# Patient Record
Sex: MALE | Race: BLACK OR AFRICAN AMERICAN | NOT HISPANIC OR LATINO | URBAN - METROPOLITAN AREA
[De-identification: names, ages, dates, MRNs, and addresses within clinical notes are randomized per-mention and may not be internally consistent; named-entity substitution may affect disease eponyms.]

---

## 2017-05-24 ENCOUNTER — OUTPATIENT (OUTPATIENT)
Dept: OUTPATIENT SERVICES | Facility: HOSPITAL | Age: 22
LOS: 1 days | Discharge: HOME | End: 2017-05-24

## 2017-06-28 DIAGNOSIS — Z01.21 ENCOUNTER FOR DENTAL EXAMINATION AND CLEANING WITH ABNORMAL FINDINGS: ICD-10-CM

## 2018-07-06 ENCOUNTER — OUTPATIENT (OUTPATIENT)
Dept: OUTPATIENT SERVICES | Facility: HOSPITAL | Age: 23
LOS: 1 days | Discharge: HOME | End: 2018-07-06

## 2022-05-11 ENCOUNTER — OUTPATIENT (OUTPATIENT)
Dept: OUTPATIENT SERVICES | Facility: HOSPITAL | Age: 27
LOS: 1 days | Discharge: HOME | End: 2022-05-11

## 2024-03-23 ENCOUNTER — EMERGENCY (EMERGENCY)
Facility: HOSPITAL | Age: 29
LOS: 0 days | Discharge: HOME | End: 2024-03-23
Attending: STUDENT IN AN ORGANIZED HEALTH CARE EDUCATION/TRAINING PROGRAM
Payer: COMMERCIAL

## 2024-03-23 VITALS
HEART RATE: 83 BPM | SYSTOLIC BLOOD PRESSURE: 118 MMHG | DIASTOLIC BLOOD PRESSURE: 61 MMHG | TEMPERATURE: 97 F | WEIGHT: 188.94 LBS | RESPIRATION RATE: 17 BRPM | OXYGEN SATURATION: 99 %
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
## 2024-03-23 DIAGNOSIS — S00.83XA CONTUSION OF OTHER PART OF HEAD, INITIAL ENCOUNTER: ICD-10-CM

## 2024-03-23 DIAGNOSIS — R56.9 UNSPECIFIED CONVULSIONS: ICD-10-CM
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
## 2024-03-23 LAB
ALBUMIN SERPL ELPH-MCNC: 4.9 G/DL — SIGNIFICANT CHANGE UP (ref 3.5–5.2)
ALP SERPL-CCNC: 44 U/L — SIGNIFICANT CHANGE UP (ref 30–115)
ALT FLD-CCNC: 12 U/L — SIGNIFICANT CHANGE UP (ref 0–41)
ANION GAP SERPL CALC-SCNC: 12 MMOL/L — SIGNIFICANT CHANGE UP (ref 7–14)
APAP SERPL-MCNC: <5 UG/ML — LOW (ref 10–30)
AST SERPL-CCNC: 22 U/L — SIGNIFICANT CHANGE UP (ref 0–41)
BASOPHILS # BLD AUTO: 0.02 K/UL — SIGNIFICANT CHANGE UP (ref 0–0.2)
BASOPHILS NFR BLD AUTO: 0.4 % — SIGNIFICANT CHANGE UP (ref 0–1)
BILIRUB SERPL-MCNC: 1.4 MG/DL — HIGH (ref 0.2–1.2)
BUN SERPL-MCNC: 14 MG/DL — SIGNIFICANT CHANGE UP (ref 10–20)
CALCIUM SERPL-MCNC: 9.3 MG/DL — SIGNIFICANT CHANGE UP (ref 8.4–10.5)
CHLORIDE SERPL-SCNC: 101 MMOL/L — SIGNIFICANT CHANGE UP (ref 98–110)
CO2 SERPL-SCNC: 23 MMOL/L — SIGNIFICANT CHANGE UP (ref 17–32)
CREAT SERPL-MCNC: 1.1 MG/DL — SIGNIFICANT CHANGE UP (ref 0.7–1.5)
EGFR: 95 ML/MIN/1.73M2 — SIGNIFICANT CHANGE UP
EOSINOPHIL # BLD AUTO: 0.14 K/UL — SIGNIFICANT CHANGE UP (ref 0–0.7)
EOSINOPHIL NFR BLD AUTO: 3.1 % — SIGNIFICANT CHANGE UP (ref 0–8)
ETHANOL SERPL-MCNC: <10 MG/DL — SIGNIFICANT CHANGE UP
GLUCOSE SERPL-MCNC: 120 MG/DL — HIGH (ref 70–99)
HCT VFR BLD CALC: 40.6 % — LOW (ref 42–52)
HGB BLD-MCNC: 13.6 G/DL — LOW (ref 14–18)
IMM GRANULOCYTES NFR BLD AUTO: 0.2 % — SIGNIFICANT CHANGE UP (ref 0.1–0.3)
LYMPHOCYTES # BLD AUTO: 1.9 K/UL — SIGNIFICANT CHANGE UP (ref 1.2–3.4)
LYMPHOCYTES # BLD AUTO: 41.8 % — SIGNIFICANT CHANGE UP (ref 20.5–51.1)
MAGNESIUM SERPL-MCNC: 2.4 MG/DL — SIGNIFICANT CHANGE UP (ref 1.8–2.4)
MCHC RBC-ENTMCNC: 27.9 PG — SIGNIFICANT CHANGE UP (ref 27–31)
MCHC RBC-ENTMCNC: 33.5 G/DL — SIGNIFICANT CHANGE UP (ref 32–37)
MCV RBC AUTO: 83.4 FL — SIGNIFICANT CHANGE UP (ref 80–94)
MONOCYTES # BLD AUTO: 0.44 K/UL — SIGNIFICANT CHANGE UP (ref 0.1–0.6)
MONOCYTES NFR BLD AUTO: 9.7 % — HIGH (ref 1.7–9.3)
NEUTROPHILS # BLD AUTO: 2.04 K/UL — SIGNIFICANT CHANGE UP (ref 1.4–6.5)
NEUTROPHILS NFR BLD AUTO: 44.8 % — SIGNIFICANT CHANGE UP (ref 42.2–75.2)
NRBC # BLD: 0 /100 WBCS — SIGNIFICANT CHANGE UP (ref 0–0)
PLATELET # BLD AUTO: 196 K/UL — SIGNIFICANT CHANGE UP (ref 130–400)
PMV BLD: 9.7 FL — SIGNIFICANT CHANGE UP (ref 7.4–10.4)
POTASSIUM SERPL-MCNC: 4.1 MMOL/L — SIGNIFICANT CHANGE UP (ref 3.5–5)
POTASSIUM SERPL-SCNC: 4.1 MMOL/L — SIGNIFICANT CHANGE UP (ref 3.5–5)
PROT SERPL-MCNC: 7.3 G/DL — SIGNIFICANT CHANGE UP (ref 6–8)
RBC # BLD: 4.87 M/UL — SIGNIFICANT CHANGE UP (ref 4.7–6.1)
RBC # FLD: 13.2 % — SIGNIFICANT CHANGE UP (ref 11.5–14.5)
SALICYLATES SERPL-MCNC: <0.3 MG/DL — LOW (ref 4–30)
SODIUM SERPL-SCNC: 136 MMOL/L — SIGNIFICANT CHANGE UP (ref 135–146)
WBC # BLD: 4.55 K/UL — LOW (ref 4.8–10.8)
WBC # FLD AUTO: 4.55 K/UL — LOW (ref 4.8–10.8)

## 2024-03-23 PROCEDURE — 99285 EMERGENCY DEPT VISIT HI MDM: CPT

## 2024-03-23 PROCEDURE — 70450 CT HEAD/BRAIN W/O DYE: CPT | Mod: 26,MC

## 2024-03-23 PROCEDURE — 82962 GLUCOSE BLOOD TEST: CPT

## 2024-03-23 PROCEDURE — 93010 ELECTROCARDIOGRAM REPORT: CPT

## 2024-03-23 PROCEDURE — 99234 HOSP IP/OBS SM DT SF/LOW 45: CPT

## 2024-03-23 PROCEDURE — 36415 COLL VENOUS BLD VENIPUNCTURE: CPT

## 2024-03-23 PROCEDURE — 99285 EMERGENCY DEPT VISIT HI MDM: CPT | Mod: 25

## 2024-03-23 PROCEDURE — 85025 COMPLETE CBC W/AUTO DIFF WBC: CPT

## 2024-03-23 PROCEDURE — 70450 CT HEAD/BRAIN W/O DYE: CPT | Mod: MC

## 2024-03-23 PROCEDURE — 83735 ASSAY OF MAGNESIUM: CPT

## 2024-03-23 PROCEDURE — 93005 ELECTROCARDIOGRAM TRACING: CPT

## 2024-03-23 PROCEDURE — 80053 COMPREHEN METABOLIC PANEL: CPT

## 2024-03-23 PROCEDURE — 80307 DRUG TEST PRSMV CHEM ANLYZR: CPT

## 2024-03-23 NOTE — ED PROVIDER NOTE - NSFOLLOWUPINSTRUCTIONS_ED_ALL_ED_FT
PLEASE CALL NEUROLOGY OFFICE TO OBTAIN APPOINTMENT. DISCUSS THAT YOU WERE SEEN IN THE ED BY DR. SARGENT WHO WOULD LIKE YOU TO HAVE AN APPOINTMENT IN THE NEXT 2-4 WEEKS. PLEASE CALL NEUROLOGY OFFICE TO OBTAIN APPOINTMENT. DISCUSS THAT YOU WERE SEEN IN THE ED BY DR. SARGENT WHO WOULD LIKE YOU TO HAVE AN APPOINTMENT IN THE NEXT 2-4 WEEKS.    Seizure, Adult  A seizure is a sudden burst of abnormal electrical and chemical activity in the brain. Seizures usually last from 30 seconds to 2 minutes. The abnormal activity temporarily interrupts normal brain function.    Many types of seizures can affect adults. A seizure can cause many different symptoms depending on where in the brain it starts.    What are the causes?  Common causes of this condition include:  Fever or infection.  Brain injury, head trauma, bleeding in the brain, or a brain tumor.  Low levels of blood sugar or salt (sodium).  Kidney problems or liver problems.  Metabolic disorders or other conditions that are passed from parent to child (are inherited).  Reaction to a substance, such as a drug or a medicine, or suddenly stopping the use of a substance (withdrawal).  A stroke.  Developmental disorders such as autism spectrum disorder or cerebral palsy.  In some cases, the cause of a seizure may not be known. Some people who have a seizure never have another one. A person who has repeated seizures over time without a clear cause has a condition called epilepsy.    What increases the risk?  You are more likely to develop this condition if:  You have a family history of epilepsy.  You have had a tonic–clonic seizure before. This type of seizure causes tightening (contraction) of the muscles of the whole body and loss of consciousness.  You have a history of head trauma, lack of oxygen at birth, or strokes.  What are the signs or symptoms?  There are many different types of seizures. The symptoms vary depending on the type of seizure you have. Symptoms occur during the seizure. They may also occur before a seizure (aura) and after a seizure (postictal). Symptoms may include the following:    Symptoms during a seizure    Uncontrollable shaking (convulsions) with fast, jerky movements of muscles.  Stiffening of the body.  Breathing problems.  Confusion, staring, or unresponsiveness.  Head nodding, eye blinking or fluttering, or rapid eye movements.  Drooling, grunting, or making clicking sounds with your mouth.  Loss of bladder control and bowel control.  Symptoms before a seizure    Fear or anxiety.  Nausea.  Vertigo. This is a feeling like:  You are moving when you are not.  Your surroundings are moving when they are not.  Déjà vu. This is a feeling of having seen or heard something before.  Odd tastes or smells.  Changes in vision, such as seeing flashing lights or spots.  Symptoms after a seizure    Confusion.  Sleepiness.  Headache.  Sore muscles.  How is this diagnosed?  This condition may be diagnosed based on:  A description of your symptoms. Video of your seizures can be helpful.  Your medical history.  A physical exam.  You may also have tests, including:  Blood tests.  CT scan.  MRI.  Electroencephalogram (EEG). This test measures electrical activity in the brain. An EEG can predict whether seizures will return.  A spinal tap, also called a lumbar puncture. This is the removal and testing of fluid that surrounds the brain and spinal cord.  How is this treated?  Most seizures will stop on their own in less than 5 minutes, and no treatment is needed. Seizures that last longer than 5 minutes will usually need treatment.    Seizures may be treated with:  Medicines given through an IV.  Avoiding known triggers, such as medicines that you take for another condition.  Medicines to control seizures or prevent future seizures (antiepileptics), if epilepsy caused your seizures.  Medical devices to prevent and control seizures.  Surgery to stop seizures or to reduce how often seizures happen, if you have epilepsy that does not respond to medicines.  A diet low in carbohydrates and high in fat (ketogenic diet).  Follow these instructions at home:  Medicines    Take over-the-counter and prescription medicines only as told by your health care provider.  Avoid any substances that may prevent your medicine from working properly, such as alcohol.  Activity    Follow instructions about activities, such as driving or swimming, that would be dangerous if you had another seizure. Wait until your health care provider says it is safe to do them.  If you live in the [place] check with your local department of motor vehicles (DMV) to find out about local driving laws. Each state has specific rules about when you can legally drive again.  Get enough rest. Lack of sleep can make seizures more likely to occur.  Educating others      Teach friends and family what to do if you have a seizure. They should:  Help you get down to the ground, to prevent a fall.  Cushion your head and move items away from your body.  Loosen any tight clothing around your neck.  Turn you on your side. If you vomit, this helps keep your airway clear.  Know whether or not you need emergency care.  Stay with you until you recover.  Also, tell them what not to do if you have a seizure. Tell them:  They should not hold you down. Holding you down will not stop the seizure.  They should not put anything in your mouth.  General instructions    Avoid anything that has ever triggered a seizure for you.  Keep a seizure diary. Record what you remember about each seizure, especially anything that might have triggered it.  Keep all follow-up visits. This is important.  Contact a health care provider if:  You have another seizure or seizures. Call each time you have a seizure.  Your seizure pattern changes.  You continue to have seizures with treatment.  You have symptoms of an infection or illness. Either of these might increase your risk of having a seizure.  You are unable to take your medicine.  Get help right away if:  You have:  A seizure that does not stop after 5 minutes.  Several seizures in a row without a complete recovery between seizures.  A seizure that makes it harder to breathe.  A seizure that leaves you unable to speak or use a part of your body.  You do not wake up right away after a seizure.  You injure yourself during a seizure.  You have confusion or pain right after a seizure.  These symptoms may represent a serious problem that is an emergency. Do not wait to see if the symptoms will go away. Get medical help right away. Call your local emergency services (911 in the [place] Do not drive yourself to the hospital.    Summary  Seizures are caused by abnormal electrical and chemical activity in the brain. The activity disrupts normal brain function and can cause various symptoms.  Seizures have many causes, including illness, head injuries, low levels of blood sugar or salt, and certain conditions.  Most seizures will stop on their own in less than 5 minutes. Seizures that last longer than 5 minutes are a medical emergency and need treatment right away.  Many medicines are used to treat seizures. Take over-the-counter and prescription medicines only as told by your health care provider.  This information is not intended to replace advice given to you by your health care provider. Make sure you discuss any questions you have with your health care provider.

## 2024-03-23 NOTE — ED PROVIDER NOTE - ATTENDING APP SHARED VISIT CONTRIBUTION OF CARE
27 y/o M no sig pmh, fasting for Ramadan, p/w sz like activity witnessed today at about  by family. lasted 1-2 minutes. + some confusion afterwards that has been improving since event.  No intra-oral injury, incontinence     IMP: new onset sz vs syncope  P: labs, ekg, cth, ivf, reassess.

## 2024-03-23 NOTE — CONSULT NOTE ADULT - ATTENDING COMMENTS
Pt w/ [facility] and no family hx epilepsy p/w first episode self-limited seizure-like activity in setting of fasting and sleep deprivation resolved and back to baseline with no focal neurologic deficits.  Suspect episode provoked by combination of complete fasting and sleep deprivation recommend take measures to avoid if possible.  No further inpt neurologic w/u at this time and may f/u as outpt in 4 wks.

## 2024-03-23 NOTE — ED ADULT TRIAGE NOTE - CHIEF COMPLAINT QUOTE
Pt BIBA found at home on floor by dad having seizure like activity for 2 mins. Pt does not recall event. FS 96 in triage. NIH 0 in triage. GCS 15. Pt  cleared by satya HORAN.

## 2024-03-23 NOTE — ED PROVIDER NOTE - NSFOLLOWUPCLINICS_GEN_ALL_ED_FT
[facility]  Neurology  [address] [place] [address], [address]  [place] [address]  Phone: [phone]  Fax:

## 2024-03-23 NOTE — ED PROVIDER NOTE - CLINICAL SUMMARY MEDICAL DECISION MAKING FREE TEXT BOX
.    Patient with seizure-like activity.  All available lab tests, imaging tests, and EKGs independently reviewed and interpreted by meMarciano.  EKG shows normal intervals, is suggestive of pericarditis, but patient has no chest pain, no exam findings supporting the diagnosis, nor any recent illness.  CT head shows no focal pathology.  Patient evaluated by neurology, feel patient is safe for discharge given seizure was likely provoked by sleep deprivation and fasting.  All results, plan of care with outpatient neurology follow-up, and signs symptoms for ED return were discussed with patient and parent.  They understand, and are comfortable discharge.  Patient is stable for discharge with follow-up as discussed.  DC home.    .

## 2024-03-23 NOTE — ED PROVIDER NOTE - PHYSICAL EXAMINATION
CONSTITUTIONAL: NAD  SKIN: Warm dry  HEAD: + small ecchymosis R forehead, minimal ttp, no cesar-offs  EYES: NL inspection, PERRLA, EOMI  ENT: MMM, no tongue or lip lac, no trauma  NECK: Supple; non tender.  CARD: RRR  RESP: CTAB  ABD: S/NT no R/G  EXT: no pedal edema  NEURO: CW WNL, no cerebellar signs, normal motor and sensory throughout, normal gait.  Awake, alert, following all commands.  PSYCH: Cooperative, appropriate.

## 2024-03-23 NOTE — ED PROVIDER NOTE - PATIENT PORTAL LINK FT
You can access the FollowMyHealth Patient Portal offered by [facility] by registering at the following website: [email or web address]. By joining [facility]’s FollowMyHealth portal, you will also be able to view your health information using other applications (apps) compatible with our system.

## 2024-03-23 NOTE — ED PROVIDER NOTE - PROGRESS NOTE DETAILS
The patient was given detailed return precautions and advised to return to the emergency department if any new symptoms developed, symptoms worsened or for any concerns. The patient was offered the opportunity to ask questions and verbalized that they understand the diagnosis and discharge instructions.      Neuro evaluated, attributing sleep deprivation and fasting as possible causes. Ok to [place] for outpatient neuro f/u

## 2024-03-23 NOTE — ED ADULT NURSE NOTE - OBJECTIVE STATEMENT
Pt brought to ed by dad for seizure @ home. Pt has no hx of seizures. Pt does not remember activity. Pt brought to ed by dad for seizure @ home. Pt has no hx of seizures. Pt does not remember activity. States he fell & hit his head on the floor. Pt reports pain behind the eyes.

## 2024-03-23 NOTE — ED ADULT NURSE NOTE - NSFALLRISKINTERV_ED_ALL_ED

## 2024-03-23 NOTE — CONSULT NOTE ADULT - ASSESSMENT
28 year old male presented with likely a new onset provoked seizure due to fasting and sleep deprivation. Neurological exam non-focal ( subtle L tongue bruises and R frontal supraorbital bruise due to fall from bed). CTH no acute IC pathologies.     Recommendations:     ***Preliminary Note***   28 year old male presented with likely a new onset provoked seizure due to fasting, dehydration and sleep deprivation. Neurological exam non-focal ( subtle L tongue bruises and R frontal supraorbital bruise due to fall from bed). CTH no acute IC pathologies.     Recommendations:   Patient and his father at the bedside, received education regarding the importance of hydration, sleep hygiene, nutrition, signs and symptoms of seizure. All their questions were answered.   They should return to ED if any other episodes occurs.   Should follow up as outpatient with neurology clinic in 2 - 4 weeks and for possible ambulatory EEG.    No AED recommended as of now.     Thank you for the courtesy of this consult, we sign off the case. Please contact us if any neurological changes or questions.  28 year old male presented with likely a first episode witnessed provoked seizure due to fasting, dehydration and sleep deprivation. Neurological exam non-focal ( subtle L tongue bruises and R frontal supraorbital bruise due to fall from bed). CTH no acute IC pathologies.     Recommendations:   Patient and his father at the bedside, received education regarding the importance of hydration, sleep hygiene, nutrition, signs and symptoms of seizure. All their questions were answered.   They should return to ED if any other episodes occurs.   Should follow up as outpatient with neurology clinic in 2 - 4 weeks and for possible ambulatory EEG.    No AED recommended as of now.     Thank you for the courtesy of this consult, we sign off the case. Please contact us if any neurological changes or questions.

## 2024-03-23 NOTE — CONSULT NOTE ADULT - SUBJECTIVE AND OBJECTIVE BOX
NEUROLOGY CONSULT    HPI:  28 year old male with no PMHx of seizure and no FHx of seizure presented with seizure like activity.   He did not sleep since 24 hr ago, was working and also went to [employer], went to bed at 6:30 AM, fell out of bed about 30 minutes later, hit his R side of head and neck, father heard the sound, witnessed b/l UE < LE shakes, lasted only 2 minutes, w/o incontinence, with subtle L tongue bite w/o bleeding, with post-ictal about 5 - 10 minutes. He is also fasting for Ramadan, his last meal was about 9 PM last night.   Pt denies any weakness, numbness, dysarthria aphasia, dysphagia, visual changes, memory deficits, Nausea or vomiting, except R frontal head mild ache and R occipital cervical area.   He denies any drugs or EtOH.   MEDICATIONS  Home Medications:    MEDICATIONS  (STANDING):    MEDICATIONS  (PRN):      FAMILY HISTORY:    SOCIAL HISTORY: negative for tobacco, alcohol, or ilicit drug use.    Allergies    No Known Allergies    Intolerances        NEURO:   MENTAL STATUS: AAOx3  LANG/SPEECH: Fluent, intact naming, repetition & comprehension  CRANIAL NERVES:  II: Pupils equal and reactive, no RAPD, normal visual field  III, IV, VI: EOM intact, no gaze preference or deviation  V: normal  VII: no facial asymmetry  VIII: normal hearing to speech  MOTOR: 5/5 in both upper and lower extremities  REFLEXES: 2/4 throughout, bilateral flexor plantars  SENSORY: Normal to touch, temperature & pin prick in all extremities  COORD: Normal finger to nose and heel to shin, no tremor, no dysmetria  Gait: deferred.   Subtle L lateral tongue bruises, small bruise over the R frontal supraorbital area (1cm x 1cm).     LABS:                        13.6   4.55  )-----------( 196      ( 23 Mar 2024 08:20 )             40.6     03-23    136  |  101  |  14  ----------------------------<  120<H>  4.1   |  23  |  1.1    Ca    9.3      23 Mar 2024 08:20  Mg     2.4     03-23    TPro  7.3  /  Alb  4.9  /  TBili  1.4<H>  /  DBili  x   /  AST  22  /  ALT  12  /  AlkPhos  44  03-23    Hemoglobin A1C:   Vitamin B12     CAPILLARY BLOOD GLUCOSE      POCT Blood Glucose.: 96 mg/dL (23 Mar 2024 07:47)      Urinalysis Basic - ( 23 Mar 2024 08:20 )    Color: x / Appearance: x / SG: x / pH: x  Gluc: 120 mg/dL / Ketone: x  / Bili: x / Urobili: x   Blood: x / Protein: x / Nitrite: x   Leuk Esterase: x / RBC: x / WBC x   Sq Epi: x / Non Sq Epi: x / Bacteria: x    < from: CT Head No Cont (03.23.24 @ 08:44) >  IMPRESSION:    No acute intracranial pathology.    --- End of Report ---    < end of copied text >

## 2024-05-07 ENCOUNTER — APPOINTMENT (OUTPATIENT)
Dept: NEUROSURGERY | Facility: CLINIC | Age: 29
End: 2024-05-07
Payer: COMMERCIAL

## 2024-05-07 VITALS — BODY MASS INDEX: 24.38 KG/M2 | WEIGHT: 190 LBS | HEIGHT: 74 IN

## 2024-05-07 DIAGNOSIS — N39.498 OTHER SPECIFIED URINARY INCONTINENCE: ICD-10-CM

## 2024-05-07 DIAGNOSIS — G89.29 LOW BACK PAIN, UNSPECIFIED: ICD-10-CM

## 2024-05-07 DIAGNOSIS — M54.50 LOW BACK PAIN, UNSPECIFIED: ICD-10-CM

## 2024-05-07 PROCEDURE — 99204 OFFICE O/P NEW MOD 45 MIN: CPT

## 2024-05-07 NOTE — ASSESSMENT
[FreeTextEntry1] : 28-year-old male presenting for an initial consultation to discuss a longstanding history of low back pain, myofascial in nature at this time.  No radiculopathy.  No diagnostic imaging for review today.  Plan: Physical therapy Pain management as needed X-rays of the lumbar spine with flexion and extension views MRI lumbar spine noncontrast Urology referral for possible stress incontinence w/u  Return after imaging has been done and a full course of physical therapy, sooner if needed.  Barbara Ojeda MS, FNP-BC Omar Unger MD

## 2024-05-07 NOTE — END OF VISIT
[Time Spent: ___ minutes] : I have spent [unfilled] minutes of time on the encounter. [FreeTextEntry3] : I have independently evaluated and examined this patient, have supervised and agree with the Advanced Clinical Practice provider and their history and physical and assessment and plan above.

## 2024-05-07 NOTE — HISTORY OF PRESENT ILLNESS
[de-identified] : Mr. MATTA is a pleasant 28-year-old RH male presenting to the office today for an initial neurosurgical evaluation to discuss longstanding low back pain.   Patient endorses that he has been experiencing isolated intermittent lower back pain since high school, for approximately 10 years. It is improved with massage; it is not associated with any radicular features but at times he feels as though he has left hip discomfort.  Patient has not participated in any conservative management and does not take anything orally for the pain.  Pain level today 2/10 and improved with massage.  He presented to his PCP, Dr. Koenig, who sent him to our office for a discussion, no imaging. Patient was concerned that approximately 4 to 5 months ago he experienced urinary incontinence when going from a seated to standing position for a few weeks and then it did not recur.  He denies any signs and symptoms of a urinary tract infection.  Not sexually active.  No signs or symptoms of bowel or bladder difficulties at this time.  No difficulties with ambulation at this time.  Patient was educated that he should participate in physical therapy including core strengthening, aerobic exercises and focal management for the lumbar spine. Deferred a consult for pain management however referral given for future reference, if needed.  Diagnostic imaging ordered in the form of an x-ray of the lumbar spine with flexion and extension views in addition to an MRI of the lumbar spine to further evaluate structure.  Patient will return after imaging has been completed and after participation in a course of PT to review his status and test results, sooner if needed.   PHYSICAL EXAM: Constitutional: Well appearing, no distress HEENT: Normocephalic Atraumatic Psychiatric: Alert and oriented to all spheres, normal mood Pulmonary: No respiratory distress  Neurologic: CN II-XII intact Palpation: No pain to palpation; neg SI joint pain Strength: Full strength in all major muscle groups, no atrophy Sensation: Full sensation to light touch in all extremities Reflexes: 2+ patellar 2+ biceps   No Rivera's No Clonus No Jeffrey's  ROM intact  SLR negative b/l Gait: Steady, walking w/o assistance.

## 2024-07-15 ENCOUNTER — RESULT REVIEW (OUTPATIENT)
Age: 29
End: 2024-07-15

## 2024-07-15 ENCOUNTER — OUTPATIENT (OUTPATIENT)
Dept: OUTPATIENT SERVICES | Facility: HOSPITAL | Age: 29
LOS: 1 days | End: 2024-07-15
Payer: COMMERCIAL

## 2024-07-15 DIAGNOSIS — M54.50 LOW BACK PAIN, UNSPECIFIED: ICD-10-CM

## 2024-07-15 PROCEDURE — 72110 X-RAY EXAM L-2 SPINE 4/>VWS: CPT | Mod: 26

## 2024-07-15 PROCEDURE — 72148 MRI LUMBAR SPINE W/O DYE: CPT

## 2024-07-15 PROCEDURE — 72110 X-RAY EXAM L-2 SPINE 4/>VWS: CPT

## 2024-07-15 PROCEDURE — 72148 MRI LUMBAR SPINE W/O DYE: CPT | Mod: 26

## 2024-07-16 ENCOUNTER — NON-APPOINTMENT (OUTPATIENT)
Age: 29
End: 2024-07-16

## 2024-07-16 DIAGNOSIS — M54.50 LOW BACK PAIN, UNSPECIFIED: ICD-10-CM

## 2024-07-23 ENCOUNTER — APPOINTMENT (OUTPATIENT)
Dept: NEUROSURGERY | Facility: CLINIC | Age: 29
End: 2024-07-23
Payer: COMMERCIAL

## 2024-07-23 VITALS — WEIGHT: 180 LBS | HEIGHT: 74 IN | BODY MASS INDEX: 23.1 KG/M2

## 2024-07-23 DIAGNOSIS — M54.50 LOW BACK PAIN, UNSPECIFIED: ICD-10-CM

## 2024-07-23 DIAGNOSIS — G89.29 LOW BACK PAIN, UNSPECIFIED: ICD-10-CM

## 2024-07-23 PROCEDURE — 99215 OFFICE O/P EST HI 40 MIN: CPT

## 2024-07-23 NOTE — ASSESSMENT
[FreeTextEntry1] : Mr. MATTA is a pleasant 28-year-old RH male presenting to the office today for a follow up neurosurgical evaluation to discuss longstanding low back pain with chronic bilateral L5 pars defects and L5 on S1 spondylolisthesis with his lower back pain significantly improved with physical therapy.  He has no severe pain at this time and finds it is 2 out of 10 pain manageable and that he has a good understanding of how to control and manage his pain conservatively.  He has not participated in pain management as of yet.  Recommend continue conservative management, the patient does not need neurosurgical intervention at this time.  May follow-up with neurosurgery on an as-needed basis moving forward.  All the patient's questions were answered and he was in full agreement with the plan above.  Thank you for allowing me to participate in your care.  Sincerely,  Omar Unger MD  Department of Neurosurgery [facility] / [facility]

## 2024-07-23 NOTE — ASSESSMENT
[FreeTextEntry1] : Mr. MATTA is a pleasant 28-year-old RH male presenting to the office today for a follow up neurosurgical evaluation to discuss longstanding low back pain with chronic bilateral L5 pars defects and L5 on S1 spondylolisthesis with his lower back pain significantly improved with physical therapy.  He has no severe pain at this time and finds it is 2 out of 10 pain manageable and that he has a good understanding of how to control and manage his pain conservatively.  He has not participated in pain management as of yet.  Recommend continue conservative management, the patient does not need neurosurgical intervention at this time.  May follow-up with neurosurgery on an as-needed basis moving forward.  All the patient's questions were answered and he was in full agreement with the plan above.  Thank you for allowing me to participate in your care.  Sincerely,  Omar Unger MD  Department of Neurosurgery [facility]

## 2024-07-23 NOTE — HISTORY OF PRESENT ILLNESS
[FreeTextEntry1] :  Mr. MATTA is a pleasant 28-year-old RH male presenting to the office today for a follow up neurosurgical evaluation to discuss longstanding low back pain.  Patient endorses that he has been experiencing isolated intermittent lower back pain since high school, for approximately 10 years. His pain has improved significantly with physical therapy which she completed approximately 8 weeks worth of this.  He notes his pain is stable at 2 out of 10 but he is able to do physical therapy exercises at home which causes pain to go away and then over the course of the day is slightly resumes to 2 out of 10 but that he feels comfortable with that he knows how to manage this pain.  His pain was so well-controlled that he did not establish care with pain management at this time.  He had lumbar flexion-extension x-rays which show overall preserved spinal alignment he had a lumbar MRI that was positive for chronic bilateral L5 pars defects as well as spondylolisthesis of L5 on S1.  Overall the patient denies any severe lower back pain at this time he denies any weakness, numbness, tingling radiating down his arms or legs he has no difficulties with bowel bladder function no difficulties with ambulation.  Physical Exam: Constitutional: Well appearing, no distress. HEENT: Normocephalic Atraumatic. Psychiatric: Awake, alert, oriented to person, place, situation and time. Normal affect. Follows commands.  Language: Speech is clear, fluent with good repetition & comprehension. No dysarthria.  Pulmonary: No respiratory distress.     Neurologic: CN II-XII intact; EOMI with no nystagmus. No facial asymmetry bilaterally, full eye closure strength bilaterally. Hearing grossly normal. Head turning & shoulder shrug intact, bilaterally. Tongue midline, normal movements, no atrophy. Smile symmetrical.  Palpation: No pain to palpation. Strength: Full strength in all major muscle groups. Sensation: Full sensation to light touch in all extremities Motor: No pronator drift, muscle strength of bilateral UE and bilateral LE: 5/5. Normal muscle tone.  Reflexes: DTR of biceps, knee normal  2+ biceps 2+ patellar  No Clonus bilaterally  No Rivera's bilaterally   Straight-Leg Raise Test Left: Negative   Straight-Leg Raise Test Right: Negative     Gait: No postural instability. Normal stance and walking without assistance.